# Patient Record
Sex: MALE | URBAN - METROPOLITAN AREA
[De-identification: names, ages, dates, MRNs, and addresses within clinical notes are randomized per-mention and may not be internally consistent; named-entity substitution may affect disease eponyms.]

---

## 2021-10-04 ENCOUNTER — OFFICE VISIT (OUTPATIENT)
Dept: URBAN - METROPOLITAN AREA CLINIC 36 | Facility: CLINIC | Age: 66
End: 2021-10-04
Payer: COMMERCIAL

## 2021-10-04 DIAGNOSIS — H35.373 PUCKERING OF MACULA, BILATERAL: Primary | ICD-10-CM

## 2021-10-04 DIAGNOSIS — H43.813 VITREOUS DEGENERATION, BILATERAL: ICD-10-CM

## 2021-10-04 DIAGNOSIS — H25.13 AGE-RELATED NUCLEAR CATARACT, BILATERAL: ICD-10-CM

## 2021-10-04 PROCEDURE — 99204 OFFICE O/P NEW MOD 45 MIN: CPT | Performed by: OPHTHALMOLOGY

## 2021-10-04 PROCEDURE — 92134 CPTRZ OPH DX IMG PST SGM RTA: CPT | Performed by: OPHTHALMOLOGY

## 2021-10-04 ASSESSMENT — INTRAOCULAR PRESSURE
OD: 21
OS: 22

## 2021-10-04 NOTE — IMPRESSION/PLAN
Impression: Puckering of macula, bilateral: H35.373. Plan: Exam and OCT demonstrates a very thick ERM with distortion of the foveal contour. The diagnosis, natural history, and prognosis of ERMs, as well as the risks and benefits of PPV/MP versus observation were discussed at length. Given the patient's current visual acuity and hindrance on activities of daily living, surgical correction was recommended. Discussed that while the distortion should braydon, the final acuity, which can take months to achieve, may or may not be an improvement over baseline. 

Surgery 23g PPV/MP/ICG/ILM peel/IVK OS

## 2021-12-07 ENCOUNTER — Encounter (OUTPATIENT)
Dept: URBAN - METROPOLITAN AREA EXTERNAL CLINIC 14 | Facility: EXTERNAL CLINIC | Age: 66
End: 2021-12-07
Payer: COMMERCIAL

## 2021-12-07 PROCEDURE — 67042 VIT FOR MACULAR HOLE: CPT | Performed by: OPHTHALMOLOGY

## 2021-12-08 ENCOUNTER — POST-OPERATIVE VISIT (OUTPATIENT)
Dept: URBAN - METROPOLITAN AREA CLINIC 13 | Facility: CLINIC | Age: 66
End: 2021-12-08
Payer: COMMERCIAL

## 2021-12-08 PROCEDURE — 99024 POSTOP FOLLOW-UP VISIT: CPT | Performed by: OPHTHALMOLOGY

## 2021-12-08 RX ORDER — TIMOLOL MALEATE 5 MG/ML
0.5 % SOLUTION/ DROPS OPHTHALMIC
Qty: 5 | Refills: 2 | Status: INACTIVE
Start: 2021-12-08 | End: 2022-01-10

## 2021-12-08 ASSESSMENT — INTRAOCULAR PRESSURE
OS: 26
OD: 25

## 2021-12-08 NOTE — IMPRESSION/PLAN
Impression: Puckering of macula, bilateral  H35.373.
-s/p 23G PPV/MP/ICG/ILM/IVK OS (12/07/2021)-SI Plan: Excellent post op course. Post operative instructions reviewed. No signs or symptoms of infection.  IOP elevated, will Rx Timolol BID OS

RTC: 1 week POS OS with OCT

## 2021-12-13 ENCOUNTER — POST-OPERATIVE VISIT (OUTPATIENT)
Dept: URBAN - METROPOLITAN AREA CLINIC 36 | Facility: CLINIC | Age: 66
End: 2021-12-13
Payer: COMMERCIAL

## 2021-12-13 PROCEDURE — 92134 CPTRZ OPH DX IMG PST SGM RTA: CPT | Performed by: OPHTHALMOLOGY

## 2021-12-13 PROCEDURE — 99024 POSTOP FOLLOW-UP VISIT: CPT | Performed by: OPHTHALMOLOGY

## 2021-12-13 ASSESSMENT — INTRAOCULAR PRESSURE
OS: 22
OD: 21

## 2021-12-13 NOTE — IMPRESSION/PLAN
Impression: S/P 23G PPV/MP/ICG/ILM/IVK OS - 6 Days. Puckering of macula, bilateral  H35.373.
-s/p 23G PPV/MP/ICG/ILM/IVK  OS (12/07/2021)-SI Plan: Excellent post op course. Condition is improving. Taper Prednisolone acetate 1% TID x 1 wk, BID x 1wk, QD x 1wk, then stop. Discontinue Ofloxacin 0.3%. 

RTC: 4 weeks POS OS with OCT OU

## 2022-01-10 ENCOUNTER — POST-OPERATIVE VISIT (OUTPATIENT)
Dept: URBAN - METROPOLITAN AREA CLINIC 36 | Facility: CLINIC | Age: 67
End: 2022-01-10

## 2022-01-10 PROCEDURE — 92134 CPTRZ OPH DX IMG PST SGM RTA: CPT | Performed by: OPHTHALMOLOGY

## 2022-01-10 PROCEDURE — 99024 POSTOP FOLLOW-UP VISIT: CPT | Performed by: OPHTHALMOLOGY

## 2022-01-10 ASSESSMENT — INTRAOCULAR PRESSURE
OD: 17
OS: 24

## 2022-01-10 NOTE — IMPRESSION/PLAN
Impression: Puckering of macula, bilateral  H35.373. 
-s/p 23G PPV/MP/ICG/ILM/IVK  OS (12/07/2021)-SI Plan: Excellent post op course. Post operative instructions reviewed. Condition is improving. 

RTC: 3-4 months DFE/OCT OU

## 2022-04-11 ENCOUNTER — OFFICE VISIT (OUTPATIENT)
Dept: URBAN - METROPOLITAN AREA CLINIC 36 | Facility: CLINIC | Age: 67
End: 2022-04-11
Payer: COMMERCIAL

## 2022-04-11 DIAGNOSIS — H35.373 PUCKERING OF MACULA, BILATERAL: Primary | ICD-10-CM

## 2022-04-11 DIAGNOSIS — H43.811 VITREOUS DEGENERATION, RIGHT EYE: ICD-10-CM

## 2022-04-11 DIAGNOSIS — H25.13 AGE-RELATED NUCLEAR CATARACT, BILATERAL: ICD-10-CM

## 2022-04-11 PROCEDURE — 99214 OFFICE O/P EST MOD 30 MIN: CPT | Performed by: OPHTHALMOLOGY

## 2022-04-11 PROCEDURE — 92134 CPTRZ OPH DX IMG PST SGM RTA: CPT | Performed by: OPHTHALMOLOGY

## 2022-04-11 ASSESSMENT — INTRAOCULAR PRESSURE
OS: 23
OD: 22

## 2022-04-11 NOTE — IMPRESSION/PLAN
Impression: Age-related nuclear cataract, bilateral: H25.13. Plan: Patient notes glare. Exam demonstrates a moderate cataract OS>OD which has progressed after PPV. Discussed R/B/A of surgery vs observation. Recommend evaluation with Dr. Afshan Brooks and Dr. Tabatha Monroe.

## 2022-04-11 NOTE — IMPRESSION/PLAN
Impression: Puckering of macula, bilateral  H35.373. 
-s/p 23G PPV/MP/ICG/ILM/IVK  OS (12/07/2021)-SI Plan: OD:  Exam and OCT demonstrate a Macular Pucker. The diagnosis, natural history, and prognosis of ERMs, as well as the risks and benefits of PPV/MP versus observation were discussed at length. Given the patient's current visual acuity and minimal hindrance on activities of daily living, observation was recommended at this time. OS: Exam and OCT demonstrates ERM peeled. Thickening is significantly improved as pre-op MV was 12.3. Vision remains blurry due to cataract. Patient cleared from retina standpoint to proceed with cataract surgery. 

RTC: 4 months DFE/OCT OU

## 2022-06-20 ENCOUNTER — OFFICE VISIT (OUTPATIENT)
Dept: URBAN - METROPOLITAN AREA CLINIC 36 | Facility: CLINIC | Age: 67
End: 2022-06-20
Payer: COMMERCIAL

## 2022-06-20 DIAGNOSIS — H25.13 AGE-RELATED NUCLEAR CATARACT, BILATERAL: ICD-10-CM

## 2022-06-20 DIAGNOSIS — H35.373 PUCKERING OF MACULA, BILATERAL: Primary | ICD-10-CM

## 2022-06-20 DIAGNOSIS — Z96.1 PRESENCE OF INTRAOCULAR LENS: ICD-10-CM

## 2022-06-20 DIAGNOSIS — H43.811 VITREOUS DEGENERATION, RIGHT EYE: ICD-10-CM

## 2022-06-20 DIAGNOSIS — H25.11 AGE-RELATED NUCLEAR CATARACT, RIGHT EYE: ICD-10-CM

## 2022-06-20 PROCEDURE — 92134 CPTRZ OPH DX IMG PST SGM RTA: CPT | Performed by: OPHTHALMOLOGY

## 2022-06-20 PROCEDURE — 92012 INTRM OPH EXAM EST PATIENT: CPT | Performed by: OPHTHALMOLOGY

## 2022-06-20 RX ORDER — PREDNISOLONE ACETATE 10 MG/ML
1 % SUSPENSION/ DROPS OPHTHALMIC
Qty: 5 | Refills: 2 | Status: ACTIVE
Start: 2022-06-20

## 2022-06-20 ASSESSMENT — INTRAOCULAR PRESSURE
OD: 25
OS: 25

## 2022-06-20 NOTE — IMPRESSION/PLAN
Impression: Puckering of macula, bilateral  H35.373. 
-s/p 23G PPV/MP/ICG/ILM/IVK  OS (12/07/2021)-SI Plan: OD:  Exam and OCT demonstrate a Macular Pucker. The diagnosis, natural history, and prognosis of ERMs, as well as the risks and benefits of PPV/MP versus observation were discussed at length. Given the patient's current visual acuity and minimal hindrance on activities of daily living, observation was recommended at this time. OS: Exam and OCT demonstrates ERM peeled. Thickening is significantly improved as pre-op MV was 12.3 and vision was 20/125 pre-operatively. Vision remains blurry even after cataract surgery; per outside note, BCVA was 20/100. Will try PF QID OS.  

RTC: 4 wks DFE/OCT OU

## 2022-06-20 NOTE — IMPRESSION/PLAN
Impression: Presence of intraocular lens: Z96.1. Plan: Looks great. Vision still blurry due to pre-operative thick ERM. [id number] Rachael Levy for updated Mrx.

## 2022-07-25 ENCOUNTER — OFFICE VISIT (OUTPATIENT)
Dept: URBAN - METROPOLITAN AREA CLINIC 36 | Facility: CLINIC | Age: 67
End: 2022-07-25
Payer: COMMERCIAL

## 2022-07-25 DIAGNOSIS — H35.373 PUCKERING OF MACULA, BILATERAL: Primary | ICD-10-CM

## 2022-07-25 DIAGNOSIS — H25.11 AGE-RELATED NUCLEAR CATARACT, RIGHT EYE: ICD-10-CM

## 2022-07-25 DIAGNOSIS — Z96.1 PRESENCE OF INTRAOCULAR LENS: ICD-10-CM

## 2022-07-25 DIAGNOSIS — H43.811 VITREOUS DEGENERATION, RIGHT EYE: ICD-10-CM

## 2022-07-25 DIAGNOSIS — H25.13 AGE-RELATED NUCLEAR CATARACT, BILATERAL: ICD-10-CM

## 2022-07-25 PROCEDURE — 92012 INTRM OPH EXAM EST PATIENT: CPT | Performed by: OPHTHALMOLOGY

## 2022-07-25 PROCEDURE — 92134 CPTRZ OPH DX IMG PST SGM RTA: CPT | Performed by: OPHTHALMOLOGY

## 2022-07-25 NOTE — IMPRESSION/PLAN
Impression: Puckering of macula, bilateral  H35.373. 
-s/p 23G PPV/MP/ICG/ILM/IVK  OS (12/07/2021)-SI Plan: OD:  Exam and OCT demonstrate a Macular Pucker. The diagnosis, natural history, and prognosis of ERMs, as well as the risks and benefits of PPV/MP versus observation were discussed at length. Given the patient's current visual acuity and minimal hindrance on activities of daily living, observation was recommended at this time. OS: Exam and OCT demonstrates ERM peeled. Thickening is significantly improved as pre-op MV was 12.3 and vision was 20/125 pre-operatively. With updated MRx after cataract surgery, vision is 20/70-. Unfortunately, no improvement on trial of PF QID OS.  Will taper PF.

RTC: 6 mo DFE/OCT OU

## 2022-07-25 NOTE — IMPRESSION/PLAN
Impression: Presence of intraocular lens: Z96.1. Plan: Looks great. Vision still blurry due to pre-operative thick ERM. tr PCO. Warning symptoms discussed.

## 2022-10-10 ENCOUNTER — OFFICE VISIT (OUTPATIENT)
Dept: URBAN - METROPOLITAN AREA CLINIC 41 | Facility: CLINIC | Age: 67
End: 2022-10-10
Payer: COMMERCIAL

## 2022-10-10 DIAGNOSIS — H25.11 AGE-RELATED NUCLEAR CATARACT, RIGHT EYE: ICD-10-CM

## 2022-10-10 DIAGNOSIS — H35.373 PUCKERING OF MACULA, BILATERAL: Primary | ICD-10-CM

## 2022-10-10 DIAGNOSIS — Z96.1 PRESENCE OF INTRAOCULAR LENS: ICD-10-CM

## 2022-10-10 DIAGNOSIS — H43.811 VITREOUS DEGENERATION, RIGHT EYE: ICD-10-CM

## 2022-10-10 PROCEDURE — 92134 CPTRZ OPH DX IMG PST SGM RTA: CPT | Performed by: OPHTHALMOLOGY

## 2022-10-10 PROCEDURE — 99214 OFFICE O/P EST MOD 30 MIN: CPT | Performed by: OPHTHALMOLOGY

## 2022-10-10 ASSESSMENT — INTRAOCULAR PRESSURE
OS: 17
OD: 20

## 2022-10-10 NOTE — IMPRESSION/PLAN
Impression: Puckering of macula, bilateral  H35.373. 
-s/p 23G PPV/MP/ICG/ILM/IVK  OS (12/07/2021)-SI Plan: OD:  Exam and OCT demonstrate a Macular Pucker. The diagnosis, natural history, and prognosis of ERMs, as well as the risks and benefits of PPV/MP versus observation were discussed at length. Given the patient's current visual acuity and minimal hindrance on activities of daily living, observation was recommended at this time. OS: Exam and OCT demonstrates ERM peeled. Thickening is significantly improved as pre-op MV was 12.3 and vision was 20/125 pre-operatively. With updated MRx after cataract surgery, vision is 20/60. Unfortunately, no improvement on trial of PF QID OS. No worsening off of PF. Cont observation Ok to follow with Dr. Lacho Garcia team for further eye visits. 

RTC: [facility] PRN

## 2022-10-10 NOTE — IMPRESSION/PLAN
Impression: Presence of intraocular lens: Z96.1. Plan: Looks great. Vision still blurry due to pre-operative thick ERM. Mild PCO is slightly worse. Warning symptoms discussed.